# Patient Record
Sex: MALE | Race: WHITE | HISPANIC OR LATINO | Employment: OTHER | URBAN - METROPOLITAN AREA
[De-identification: names, ages, dates, MRNs, and addresses within clinical notes are randomized per-mention and may not be internally consistent; named-entity substitution may affect disease eponyms.]

---

## 2019-10-24 DIAGNOSIS — J45.909 ASTHMA, UNSPECIFIED ASTHMA SEVERITY, UNSPECIFIED WHETHER COMPLICATED, UNSPECIFIED WHETHER PERSISTENT: Primary | ICD-10-CM

## 2019-10-25 DIAGNOSIS — I83.813 VARICOSE VEINS OF BOTH LOWER EXTREMITIES WITH PAIN: Primary | ICD-10-CM

## 2019-10-29 ENCOUNTER — TELEPHONE (OUTPATIENT)
Dept: VASCULAR SURGERY | Facility: CLINIC | Age: 84
End: 2019-10-29

## 2019-10-29 DIAGNOSIS — I87.2 VENOUS INSUFFICIENCY: Primary | ICD-10-CM

## 2019-10-29 NOTE — TELEPHONE ENCOUNTER
Left message that order is in for ultrasound and she can call Olivia or Cynthia at [phone] to schedule the ultrasound. Provided my cell for her to call me to schedule clinic appointment tomorrow, as I will be in [place] for Leadership.

## 2019-11-11 ENCOUNTER — OFFICE VISIT (OUTPATIENT)
Dept: UROLOGY | Facility: CLINIC | Age: 84
End: 2019-11-11

## 2019-11-11 ENCOUNTER — OFFICE VISIT (OUTPATIENT)
Dept: INTERNAL MEDICINE | Facility: CLINIC | Age: 84
End: 2019-11-11

## 2019-11-11 ENCOUNTER — OFFICE VISIT (OUTPATIENT)
Dept: DERMATOLOGY | Facility: CLINIC | Age: 84
End: 2019-11-11

## 2019-11-11 ENCOUNTER — HOSPITAL ENCOUNTER (OUTPATIENT)
Dept: CARDIOLOGY | Facility: CLINIC | Age: 84
Discharge: HOME | End: 2019-11-11

## 2019-11-11 VITALS
WEIGHT: 178.56 LBS | HEART RATE: 48 BPM | BODY MASS INDEX: 29.75 KG/M2 | SYSTOLIC BLOOD PRESSURE: 188 MMHG | HEIGHT: 65 IN | DIASTOLIC BLOOD PRESSURE: 85 MMHG

## 2019-11-11 DIAGNOSIS — R03.0 ELEVATED BLOOD PRESSURE READING WITHOUT DIAGNOSIS OF HYPERTENSION: ICD-10-CM

## 2019-11-11 DIAGNOSIS — L98.9 DISEASE OF SKIN AND SUBCUTANEOUS TISSUE: Primary | ICD-10-CM

## 2019-11-11 DIAGNOSIS — Z85.46 HISTORY OF PROSTATE CANCER: Primary | ICD-10-CM

## 2019-11-11 DIAGNOSIS — Z00.00 ROUTINE GENERAL MEDICAL EXAMINATION AT A HEALTH CARE FACILITY: ICD-10-CM

## 2019-11-11 DIAGNOSIS — Z12.5 PROSTATE CANCER SCREENING: ICD-10-CM

## 2019-11-11 DIAGNOSIS — L29.9 PRURITUS: ICD-10-CM

## 2019-11-11 DIAGNOSIS — Z00.00 ROUTINE GENERAL MEDICAL EXAMINATION AT A HEALTH CARE FACILITY: Primary | ICD-10-CM

## 2019-11-11 LAB
BILIRUB SERPL-MCNC: NORMAL MG/DL
BLOOD URINE, POC: NORMAL
COLOR, POC UA: NORMAL
GLUCOSE UR QL STRIP: NORMAL
KETONES UR QL STRIP: NORMAL
LEUKOCYTE ESTERASE URINE, POC: NORMAL
NITRITE, POC UA: NORMAL
PH, POC UA: 5
PROTEIN, POC: NORMAL
SPECIFIC GRAVITY, POC UA: 1.01
UROBILINOGEN, POC UA: NORMAL

## 2019-11-11 PROCEDURE — 93010 EKG 12-LEAD: ICD-10-PCS | Mod: S$PBB,,, | Performed by: INTERNAL MEDICINE

## 2019-11-11 PROCEDURE — 88313 PR  SPECIAL STAINS,GROUP II: ICD-10-PCS | Mod: 26,,, | Performed by: DERMATOLOGY

## 2019-11-11 PROCEDURE — 99212 OFFICE O/P EST SF 10 MIN: CPT | Mod: PBBFAC,25,27 | Performed by: INTERNAL MEDICINE

## 2019-11-11 PROCEDURE — 99999 PR PBB SHADOW E&M-EST. PATIENT-LVL III: CPT | Mod: PBBFAC,,, | Performed by: UROLOGY

## 2019-11-11 PROCEDURE — 99202 PR OFFICE/OUTPT VISIT, NEW, LEVL II, 15-29 MIN: ICD-10-PCS | Mod: 25,S$PBB,, | Performed by: DERMATOLOGY

## 2019-11-11 PROCEDURE — 93005 ELECTROCARDIOGRAM TRACING: CPT | Mod: PBBFAC | Performed by: INTERNAL MEDICINE

## 2019-11-11 PROCEDURE — 99202 OFFICE O/P NEW SF 15 MIN: CPT | Mod: S$PBB,,, | Performed by: INTERNAL MEDICINE

## 2019-11-11 PROCEDURE — 99999 PR PBB SHADOW E&M-EST. PATIENT-LVL III: CPT | Mod: PBBFAC,,, | Performed by: DERMATOLOGY

## 2019-11-11 PROCEDURE — 99202 OFFICE O/P NEW SF 15 MIN: CPT | Mod: 25,S$PBB,, | Performed by: DERMATOLOGY

## 2019-11-11 PROCEDURE — 99999 PR PBB SHADOW E&M-EST. PATIENT-LVL II: ICD-10-PCS | Mod: PBBFAC,,, | Performed by: INTERNAL MEDICINE

## 2019-11-11 PROCEDURE — 99213 OFFICE O/P EST LOW 20 MIN: CPT | Mod: PBBFAC,25 | Performed by: DERMATOLOGY

## 2019-11-11 PROCEDURE — 99213 OFFICE O/P EST LOW 20 MIN: CPT | Mod: PBBFAC,25,27 | Performed by: UROLOGY

## 2019-11-11 PROCEDURE — 99999 PR PBB SHADOW E&M-EST. PATIENT-LVL III: ICD-10-PCS | Mod: PBBFAC,,, | Performed by: DERMATOLOGY

## 2019-11-11 PROCEDURE — 88313 SPECIAL STAINS GROUP 2: CPT | Mod: 26,,, | Performed by: DERMATOLOGY

## 2019-11-11 PROCEDURE — 11104 PUNCH BX SKIN SINGLE LESION: CPT | Mod: S$PBB,,, | Performed by: DERMATOLOGY

## 2019-11-11 PROCEDURE — 11104 PUNCH BX SKIN SINGLE LESION: CPT | Mod: PBBFAC | Performed by: DERMATOLOGY

## 2019-11-11 PROCEDURE — 81001 URINALYSIS AUTO W/SCOPE: CPT | Mod: PBBFAC | Performed by: UROLOGY

## 2019-11-11 PROCEDURE — 88312 SPECIAL STAINS GROUP 1: CPT | Mod: 26,,, | Performed by: DERMATOLOGY

## 2019-11-11 PROCEDURE — 88305 TISSUE EXAM BY PATHOLOGIST: CPT | Performed by: DERMATOLOGY

## 2019-11-11 PROCEDURE — 88305 TISSUE EXAM BY PATHOLOGIST: CPT | Mod: 26,,, | Performed by: DERMATOLOGY

## 2019-11-11 PROCEDURE — 99202 PR OFFICE/OUTPT VISIT, NEW, LEVL II, 15-29 MIN: ICD-10-PCS | Mod: S$PBB,,, | Performed by: INTERNAL MEDICINE

## 2019-11-11 PROCEDURE — 88305 TISSUE EXAM BY PATHOLOGIST: ICD-10-PCS | Mod: 26,,, | Performed by: DERMATOLOGY

## 2019-11-11 PROCEDURE — 88312 PR  SPECIAL STAINS,GROUP I: ICD-10-PCS | Mod: 26,,, | Performed by: DERMATOLOGY

## 2019-11-11 PROCEDURE — 93010 ELECTROCARDIOGRAM REPORT: CPT | Mod: S$PBB,,, | Performed by: INTERNAL MEDICINE

## 2019-11-11 PROCEDURE — 11104 PR PUNCH BIOPSY, SKIN, SINGLE LESION: ICD-10-PCS | Mod: S$PBB,,, | Performed by: DERMATOLOGY

## 2019-11-11 PROCEDURE — 99203 PR OFFICE/OUTPT VISIT, NEW, LEVL III, 30-44 MIN: ICD-10-PCS | Mod: S$PBB,,, | Performed by: UROLOGY

## 2019-11-11 PROCEDURE — 88313 SPECIAL STAINS GROUP 2: CPT | Performed by: DERMATOLOGY

## 2019-11-11 PROCEDURE — 88312 SPECIAL STAINS GROUP 1: CPT | Performed by: DERMATOLOGY

## 2019-11-11 PROCEDURE — 99203 OFFICE O/P NEW LOW 30 MIN: CPT | Mod: S$PBB,,, | Performed by: UROLOGY

## 2019-11-11 PROCEDURE — 99999 PR PBB SHADOW E&M-EST. PATIENT-LVL II: CPT | Mod: PBBFAC,,, | Performed by: INTERNAL MEDICINE

## 2019-11-11 PROCEDURE — 99999 PR PBB SHADOW E&M-EST. PATIENT-LVL III: ICD-10-PCS | Mod: PBBFAC,,, | Performed by: UROLOGY

## 2019-11-11 RX ORDER — AMLODIPINE BESYLATE 2.5 MG/1
TABLET ORAL
Qty: 10 TABLET | Refills: 0 | Status: ON HOLD | OUTPATIENT
Start: 2019-11-11 | End: 2019-11-19 | Stop reason: SDUPTHER

## 2019-11-11 RX ORDER — FLUTICASONE PROPIONATE 0.05 MG/G
OINTMENT TOPICAL 2 TIMES DAILY
Qty: 30 G | Refills: 2 | Status: SHIPPED | OUTPATIENT
Start: 2019-11-11

## 2019-11-11 NOTE — LETTER
November 11, 2019      Darian Cox MD  [address]  [facility] [address]           [facility]  [address]  [facility] [address]  Phone: [phone]          Patient: Hakan Chan   MR Number: [id number]   YOB: 1935   Date of Visit: 11/11/2019       Dear Dr. Darian Cox:    Thank you for referring Hakan Chan to me for evaluation. Attached you will find relevant portions of my assessment and plan of care.    If you have questions, please do not hesitate to call me. I look forward to following Hakan Chan along with you.    Sincerely,    Abe Walter MD    Enclosure  CC:  No Recipients    If you would like to receive this communication electronically, please contact [email or web address] or [phone] to request more information on [employer] Link access.    For providers and/or their staff who would like to refer a patient to [facility], please contact us through our one-stop-shop provider referral line, [facility], at [phone].    If you feel you have received this communication in error or would no longer like to receive these types of communications, please e-mail [email or web address]

## 2019-11-11 NOTE — PROGRESS NOTES
Subjective:       Patient ID: Hakan Chan is a 84 y.o. male.    Chief Complaint:  Prostate Cancer      History of Present Illness  HPI  Patient is a 84 y.o. male who is new to our clinic and referred by their PCP, Dr. Cox for evaluation of prostate cancer.   The patient is from [facility] and is visiting for medical care.  He is with his daughter who much of the medical history was obtained from today.  He underwent a radical prostatectomy 16 years ago here at [facility].  He subsequently developed a bladder neck contracture for which in the legs he documents I can see that he underwent a trans urethral resection of a bladder neck contracture by Dr. Rubio in 2004.    He reports occasional suprapubic pain.  No significant urinary incontinence.  Otherwise no complaints.  He does not have a recent PSA.  No records are available from outside care.      Review of Systems  Review of Systems  All other systems reviewed and negative except pertinent positives noted in HPI.       Objective:     Physical Exam   Nursing note and vitals reviewed.  Constitutional: He is oriented to person, place, and time. Vital signs are normal. He appears well-developed and well-nourished. He is cooperative.   HENT:   Head: Normocephalic.   Neck: No tracheal deviation present.   Cardiovascular: Normal rate and intact distal pulses.    Pulmonary/Chest: Effort normal. No accessory muscle usage. No tachypnea. No respiratory distress.   Abdominal: Soft. Normal appearance. He exhibits no distension, no fluid wave, no ascites and no mass. There is no tenderness. There is no rebound and no CVA tenderness. No hernia. Hernia confirmed negative in the right inguinal area and confirmed negative in the left inguinal area.       Liver/spleen non-palpable.   Genitourinary: Prostate normal, testes normal and penis normal. Rectal exam shows no external hemorrhoid, no mass, no tenderness and anal tone normal. Prostate is not tender. Right testis  shows no mass and no tenderness. Right testis is descended. Left testis shows no mass and no tenderness. Left testis is descended. Circumcised. Paraphimosis: ARNULFO deferred.   Genitourinary Comments: Scrotum showed no rashes or lesions.  Anus/perineum without lesion.  Epididymis showed no masses or tenderness. No meatal stenosis, meatus in normal location. No penile discharge/plaques.        Musculoskeletal: Normal range of motion.   Lymphadenopathy: No inguinal adenopathy noted on the right or left side.        Right: No inguinal adenopathy present.        Left: No inguinal adenopathy present.   Neurological: He is alert and oriented to person, place, and time. He has normal strength.   Skin: No bruising and no rash noted.     Psychiatric: He has a normal mood and affect. His speech is normal and behavior is normal. Thought content normal.       Lab Review  Lab Results   Component Value Date    COLORU Yellow 08/20/2014    SPECGRAV 1.015 08/20/2014    PHUR 7.0 08/20/2014    NITRITE Negative 08/20/2014    KETONESU Negative 08/20/2014    UROBILINOGEN 1.0 08/20/2014         Assessment:        1. History of prostate cancer            Plan:     History of prostate cancer  -     POCT urinalysis, dipstick or tablet reag  -     Bladder scan    -A post void residual bladder scan was performed immediately after voiding. The patient's PVR was noted to be 0cc.  -he is noncompliant with his CPAP and untreated obstructive sleep apnea can contribute to his significant nocturia which he complains about today.  -urine dip negative for nitrites, negative leukocytes, negative blood---no evidence of infection  -PSA tomorrow ordered by PCP  -follow-up as needed moving forward--PSA will dictate follow-up plan.

## 2019-11-11 NOTE — PROGRESS NOTES
Subjective:       Patient ID:  Hakan Chan is a 84 y.o. male who presents for   Chief Complaint   Patient presents with    Spot     r side of lip and neckline      Spot  - Initial  Affected locations: neck and lips  Duration: 1 year  Signs / symptoms: itching  Severity: mild  Timing: constant  Aggravated by: nothing  Relieving factors/Treatments tried: nothing  Improvement on treatment: no relief        Review of Systems   Constitutional: Negative for fever, chills and fatigue.   Skin: Negative for daily sunscreen use, recent sunburn and wears hat.   Hematologic/Lymphatic: Bruises/bleeds easily.        Objective:    Physical Exam   Constitutional: He appears well-developed and well-nourished.   Neurological: He is alert and oriented to person, place, and time.   Psychiatric: He has a normal mood and affect.   Skin:   Areas Examined (abnormalities noted in diagram):   Head / Face Inspection Performed  Neck Inspection Performed  Chest / Axilla Inspection Performed              Diagram Legend     Erythematous scaling macule/papule c/w actinic keratosis       Vascular papule c/w angioma      Pigmented verrucoid papule/plaque c/w seborrheic keratosis      Yellow umbilicated papule c/w sebaceous hyperplasia      Irregularly shaped tan macule c/w lentigo     1-2 mm smooth white papules consistent with Milia      Movable subcutaneous cyst with punctum c/w epidermal inclusion cyst      Subcutaneous movable cyst c/w pilar cyst      Firm pink to brown papule c/w dermatofibroma      Pedunculated fleshy papule(s) c/w skin tag(s)      Evenly pigmented macule c/w junctional nevus     Mildly variegated pigmented, slightly irregular-bordered macule c/w mildly atypical nevus      Flesh colored to evenly pigmented papule c/w intradermal nevus       Pink pearly papule/plaque c/w basal cell carcinoma      Erythematous hyperkeratotic cursted plaque c/w SCC      Surgical scar with no sign of skin cancer recurrence      Open and  closed comedones      Inflammatory papules and pustules      Verrucoid papule consistent consistent with wart     Erythematous eczematous patches and plaques     Dystrophic onycholytic nail with subungual debris c/w onychomycosis     Umbilicated papule    Erythematous-base heme-crusted tan verrucoid plaque consistent with inflamed seborrheic keratosis     Erythematous Silvery Scaling Plaque c/w Psoriasis     See annotation                  Assessment / Plan:      Pathology Orders:     Normal Orders This Visit    Specimen to Pathology, Dermatology     Questions:    Procedure Type:  Dermatology and skin neoplasms    Number of Specimens:  1    ------------------------:  -------------------------    Spec 1 Procedure:  Biopsy    Spec 1 Clinical Impression:  erythematous to vilaceous 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 [facility] vs. other    Spec 1 Source:  Left cheek        Disease of skin and subcutaneous tissue  -     Specimen to Pathology, Dermatology    Punch biopsy procedure note:  Punch biopsy performed after verbal consent obtained. Area marked and prepped with alcohol. Approximately 1cc of 1% lidocaine with epinephrine injected. 4 mm disposable punch used to remove lesion. Hemostasis obtained and biopsy site closed with 1 - 2 Prolene sutures. Wound care instructions reviewed with patient and handout given.      Pruritus  -     fluticasone propionate (CUTIVATE) 0.005 % ointment; Apply topically 2 (two) times daily.  Dispense: 30 g; Refill: 2  - Use cerave anti- itch cream as often a needed put in fridge. Ice will help with itch as well.                No follow-ups on file.

## 2019-11-12 ENCOUNTER — HOSPITAL ENCOUNTER (OUTPATIENT)
Dept: RADIOLOGY | Facility: HOSPITAL | Age: 84
Discharge: HOME | End: 2019-11-12
Attending: INTERNAL MEDICINE

## 2019-11-12 ENCOUNTER — HOSPITAL ENCOUNTER (OUTPATIENT)
Dept: PULMONOLOGY | Facility: CLINIC | Age: 84
Discharge: HOME | End: 2019-11-12

## 2019-11-12 ENCOUNTER — LAB VISIT (OUTPATIENT)
Dept: LAB | Facility: HOSPITAL | Age: 84
End: 2019-11-12
Attending: INTERNAL MEDICINE

## 2019-11-12 ENCOUNTER — OFFICE VISIT (OUTPATIENT)
Dept: PULMONOLOGY | Facility: CLINIC | Age: 84
End: 2019-11-12

## 2019-11-12 VITALS
SYSTOLIC BLOOD PRESSURE: 122 MMHG | OXYGEN SATURATION: 97 % | WEIGHT: 180 LBS | BODY MASS INDEX: 29.99 KG/M2 | DIASTOLIC BLOOD PRESSURE: 79 MMHG | HEART RATE: 54 BPM | HEIGHT: 65 IN

## 2019-11-12 VITALS — DIASTOLIC BLOOD PRESSURE: 94 MMHG | HEART RATE: 62 BPM | SYSTOLIC BLOOD PRESSURE: 178 MMHG

## 2019-11-12 DIAGNOSIS — J45.909 ASTHMA, UNSPECIFIED ASTHMA SEVERITY, UNSPECIFIED WHETHER COMPLICATED, UNSPECIFIED WHETHER PERSISTENT: ICD-10-CM

## 2019-11-12 DIAGNOSIS — Z12.5 PROSTATE CANCER SCREENING: ICD-10-CM

## 2019-11-12 DIAGNOSIS — J45.909 ASTHMA, UNSPECIFIED ASTHMA SEVERITY, UNSPECIFIED WHETHER COMPLICATED, UNSPECIFIED WHETHER PERSISTENT: Primary | ICD-10-CM

## 2019-11-12 DIAGNOSIS — R03.0 ELEVATED BLOOD PRESSURE READING WITHOUT DIAGNOSIS OF HYPERTENSION: ICD-10-CM

## 2019-11-12 DIAGNOSIS — Z00.00 ROUTINE GENERAL MEDICAL EXAMINATION AT A HEALTH CARE FACILITY: ICD-10-CM

## 2019-11-12 LAB
25(OH)D3+25(OH)D2 SERPL-MCNC: 25 NG/ML (ref 30–96)
ALBUMIN SERPL BCP-MCNC: 4.1 G/DL (ref 3.5–5.2)
ALP SERPL-CCNC: 90 U/L (ref 55–135)
ALT SERPL W/O P-5'-P-CCNC: 20 U/L (ref 10–44)
ANION GAP SERPL CALC-SCNC: 9 MMOL/L (ref 8–16)
AST SERPL-CCNC: 25 U/L (ref 10–40)
BASOPHILS # BLD AUTO: 0.07 K/UL (ref 0–0.2)
BASOPHILS NFR BLD: 1 % (ref 0–1.9)
BILIRUB SERPL-MCNC: 1.5 MG/DL (ref 0.1–1)
BNP SERPL-MCNC: 38 PG/ML (ref 0–99)
BUN SERPL-MCNC: 23 MG/DL (ref 8–23)
CALCIUM SERPL-MCNC: 9.7 MG/DL (ref 8.7–10.5)
CHLORIDE SERPL-SCNC: 110 MMOL/L (ref 95–110)
CHOLEST SERPL-MCNC: 177 MG/DL (ref 120–199)
CHOLEST/HDLC SERPL: 3.8 {RATIO} (ref 2–5)
CO2 SERPL-SCNC: 25 MMOL/L (ref 23–29)
COMPLEXED PSA SERPL-MCNC: 0.16 NG/ML (ref 0–4)
CREAT SERPL-MCNC: 1.4 MG/DL (ref 0.5–1.4)
DIFFERENTIAL METHOD: ABNORMAL
DLCO ADJ PRE: 16.95 ML/(MIN*MMHG) (ref 11.59–25.45)
DLCO SINGLE BREATH LLN: 11.59
DLCO SINGLE BREATH PRE REF: 91.5 %
DLCO SINGLE BREATH REF: 18.52
DLCOC SBVA LLN: 1.86
DLCOC SBVA PRE REF: 110.7 %
DLCOC SBVA REF: 3.25
DLCOC SINGLE BREATH LLN: 11.59
DLCOC SINGLE BREATH PRE REF: 91.5 %
DLCOC SINGLE BREATH REF: 18.52
DLCOCSBVAULN: 4.63
DLCOCSINGLEBREATHULN: 25.45
DLCOSINGLEBREATHULN: 25.45
DLCOVA LLN: 1.86
DLCOVA PRE REF: 110.7 %
DLCOVA PRE: 3.59 ML/(MIN*MMHG*L) (ref 1.86–4.63)
DLCOVA REF: 3.25
DLCOVAULN: 4.63
DLVAADJ PRE: 3.59 ML/(MIN*MMHG*L) (ref 1.86–4.63)
EOSINOPHIL # BLD AUTO: 0.5 K/UL (ref 0–0.5)
EOSINOPHIL NFR BLD: 7.5 % (ref 0–8)
ERYTHROCYTE [DISTWIDTH] IN BLOOD BY AUTOMATED COUNT: 13.6 % (ref 11.5–14.5)
EST. GFR  (AFRICAN AMERICAN): 53 ML/MIN/1.73 M^2
EST. GFR  (NON AFRICAN AMERICAN): 45.8 ML/MIN/1.73 M^2
ESTIMATED AVG GLUCOSE: 114 MG/DL (ref 68–131)
FEF 25 75 LLN: 0.56
FEF 25 75 PRE REF: 151.2 %
FEF 25 75 REF: 1.58
FEV05 LLN: 0.61
FEV05 REF: 1.75
FEV1 FVC LLN: 60
FEV1 FVC PRE REF: 103.3 %
FEV1 FVC REF: 75
FEV1 LLN: 1.48
FEV1 PRE REF: 119.6 %
FEV1 REF: 2.18
FVC LLN: 2.08
FVC PRE REF: 114.8 %
FVC REF: 2.91
GLUCOSE SERPL-MCNC: 111 MG/DL (ref 70–110)
HBA1C MFR BLD HPLC: 5.6 % (ref 4–5.6)
HCT VFR BLD AUTO: 42.4 % (ref 40–54)
HDLC SERPL-MCNC: 47 MG/DL (ref 40–75)
HDLC SERPL: 26.6 % (ref 20–50)
HGB BLD-MCNC: 13.5 G/DL (ref 14–18)
IMM GRANULOCYTES # BLD AUTO: 0.03 K/UL (ref 0–0.04)
IMM GRANULOCYTES NFR BLD AUTO: 0.4 % (ref 0–0.5)
IVC PRE: 3.13 L (ref 2.08–3.75)
IVC SINGLE BREATH LLN: 2.08
IVC SINGLE BREATH PRE REF: 107.6 %
IVC SINGLE BREATH REF: 2.91
IVCSINGLEBREATHULN: 3.75
LDLC SERPL CALC-MCNC: 114.4 MG/DL (ref 63–159)
LYMPHOCYTES # BLD AUTO: 1.1 K/UL (ref 1–4.8)
LYMPHOCYTES NFR BLD: 14.9 % (ref 18–48)
MCH RBC QN AUTO: 29.5 PG (ref 27–31)
MCHC RBC AUTO-ENTMCNC: 31.8 G/DL (ref 32–36)
MCV RBC AUTO: 93 FL (ref 82–98)
MONOCYTES # BLD AUTO: 0.6 K/UL (ref 0.3–1)
MONOCYTES NFR BLD: 7.6 % (ref 4–15)
MVV LLN: 66
MVV REF: 78
NEUTROPHILS # BLD AUTO: 5 K/UL (ref 1.8–7.7)
NEUTROPHILS NFR BLD: 68.6 % (ref 38–73)
NONHDLC SERPL-MCNC: 130 MG/DL
NRBC BLD-RTO: 0 /100 WBC
PEF LLN: 2.77
PEF PRE REF: 167.4 %
PEF REF: 4.92
PLATELET # BLD AUTO: 224 K/UL (ref 150–350)
PMV BLD AUTO: 9.4 FL (ref 9.2–12.9)
POTASSIUM SERPL-SCNC: 4.5 MMOL/L (ref 3.5–5.1)
PRE DLCO: 16.95 ML/(MIN*MMHG) (ref 11.59–25.45)
PRE FEF 25 75: 2.39 L/S (ref 0.56–2.59)
PRE FET 100: 7.81 SEC
PRE FEV05 REF: 124.1 %
PRE FEV1 FVC: 77.93 % (ref 59.84–91)
PRE FEV1: 2.61 L (ref 1.48–2.87)
PRE FEV5: 2.17 L (ref 0.61–2.88)
PRE FVC: 3.34 L (ref 2.08–3.75)
PRE PEF: 8.24 L/S (ref 2.77–7.08)
PROT SERPL-MCNC: 7 G/DL (ref 6–8.4)
RBC # BLD AUTO: 4.57 M/UL (ref 4.6–6.2)
SODIUM SERPL-SCNC: 144 MMOL/L (ref 136–145)
TRIGL SERPL-MCNC: 78 MG/DL (ref 30–150)
TSH SERPL DL<=0.005 MIU/L-ACNC: 1.43 UIU/ML (ref 0.4–4)
VA PRE: 4.72 L (ref 5.55–5.55)
VA SINGLE BREATH LLN: 5.55
VA SINGLE BREATH PRE REF: 84.9 %
VA SINGLE BREATH REF: 5.55
VASINGLEBREATHULN: 5.55
WBC # BLD AUTO: 7.23 K/UL (ref 3.9–12.7)

## 2019-11-12 PROCEDURE — 82306 VITAMIN D 25 HYDROXY: CPT

## 2019-11-12 PROCEDURE — 80061 LIPID PANEL: CPT

## 2019-11-12 PROCEDURE — 80053 COMPREHEN METABOLIC PANEL: CPT

## 2019-11-12 PROCEDURE — 85025 COMPLETE CBC W/AUTO DIFF WBC: CPT

## 2019-11-12 PROCEDURE — 71046 X-RAY EXAM CHEST 2 VIEWS: CPT | Mod: TC,FY

## 2019-11-12 PROCEDURE — 94010 BREATHING CAPACITY TEST: CPT | Mod: PBBFAC | Performed by: INTERNAL MEDICINE

## 2019-11-12 PROCEDURE — 99214 OFFICE O/P EST MOD 30 MIN: CPT | Mod: PBBFAC,25 | Performed by: INTERNAL MEDICINE

## 2019-11-12 PROCEDURE — 94010 BREATHING CAPACITY TEST: CPT | Mod: 26,S$PBB,, | Performed by: INTERNAL MEDICINE

## 2019-11-12 PROCEDURE — 83036 HEMOGLOBIN GLYCOSYLATED A1C: CPT

## 2019-11-12 PROCEDURE — 99204 OFFICE O/P NEW MOD 45 MIN: CPT | Mod: 25,S$PBB,, | Performed by: INTERNAL MEDICINE

## 2019-11-12 PROCEDURE — 99204 PR OFFICE/OUTPT VISIT, NEW, LEVL IV, 45-59 MIN: ICD-10-PCS | Mod: 25,S$PBB,, | Performed by: INTERNAL MEDICINE

## 2019-11-12 PROCEDURE — 71046 XR CHEST PA AND LATERAL: ICD-10-PCS | Mod: 26,,, | Performed by: RADIOLOGY

## 2019-11-12 PROCEDURE — 83880 ASSAY OF NATRIURETIC PEPTIDE: CPT

## 2019-11-12 PROCEDURE — 84153 ASSAY OF PSA TOTAL: CPT

## 2019-11-12 PROCEDURE — 94729 DIFFUSING CAPACITY: CPT | Mod: 26,S$PBB,, | Performed by: INTERNAL MEDICINE

## 2019-11-12 PROCEDURE — 99999 PR PBB SHADOW E&M-EST. PATIENT-LVL IV: ICD-10-PCS | Mod: PBBFAC,,, | Performed by: INTERNAL MEDICINE

## 2019-11-12 PROCEDURE — 94729 PR C02/MEMBANE DIFFUSE CAPACITY: ICD-10-PCS | Mod: 26,S$PBB,, | Performed by: INTERNAL MEDICINE

## 2019-11-12 PROCEDURE — 84443 ASSAY THYROID STIM HORMONE: CPT

## 2019-11-12 PROCEDURE — 71046 X-RAY EXAM CHEST 2 VIEWS: CPT | Mod: 26,,, | Performed by: RADIOLOGY

## 2019-11-12 PROCEDURE — 36415 COLL VENOUS BLD VENIPUNCTURE: CPT

## 2019-11-12 PROCEDURE — 99999 PR PBB SHADOW E&M-EST. PATIENT-LVL IV: CPT | Mod: PBBFAC,,, | Performed by: INTERNAL MEDICINE

## 2019-11-12 PROCEDURE — 94729 DIFFUSING CAPACITY: CPT | Mod: PBBFAC | Performed by: INTERNAL MEDICINE

## 2019-11-12 PROCEDURE — 94010 BREATHING CAPACITY TEST: ICD-10-PCS | Mod: 26,S$PBB,, | Performed by: INTERNAL MEDICINE

## 2019-11-12 RX ORDER — ALBUTEROL SULFATE 90 UG/1
2 AEROSOL, METERED RESPIRATORY (INHALATION) EVERY 6 HOURS PRN
Qty: 1 INHALER | Refills: 11 | Status: SHIPPED | OUTPATIENT
Start: 2019-11-12 | End: 2019-11-12

## 2019-11-12 RX ORDER — ALBUTEROL SULFATE 90 UG/1
2 AEROSOL, METERED RESPIRATORY (INHALATION) EVERY 6 HOURS PRN
Qty: 8.5 G | Refills: 11 | Status: SHIPPED | OUTPATIENT
Start: 2019-11-12

## 2019-11-12 RX ORDER — MONTELUKAST SODIUM 10 MG/1
10 TABLET ORAL NIGHTLY
Qty: 30 TABLET | Refills: 11 | Status: SHIPPED | OUTPATIENT
Start: 2019-11-12 | End: 2019-11-12

## 2019-11-12 RX ORDER — MONTELUKAST SODIUM 10 MG/1
10 TABLET ORAL NIGHTLY
Qty: 30 TABLET | Refills: 11 | Status: SHIPPED | OUTPATIENT
Start: 2019-11-12

## 2019-11-12 NOTE — PROGRESS NOTES
Subjective:       Patient ID: Hakan Chan is a 84 y.o. male.    Chief Complaint: Annual Exam    HPI Mr Chan is herlinda pleasant gentleman from [facility] here to re-establish care. It has been several years since his last visit. Overall doing well and had no specific complaints. He has issues with hearing loss and wears aides for this, but not pleased with the results. We briefly discussed newer aides available, but he is not staying in this country long. He reports some issues with lightheadeness when he rises from sitting or lying positions, but recovers rapidly. On exam his BP was elevated and he experienced mild lightheadeness when he arose from the examining table. I suspect that his BP has some influence in this and I decided on trila of low dose amlodipine 2.5 mgs daily and will see how he tolerates it. I will check his BP before he leaves the country and adjust dose if indicated. Benefits and risks, side effects were discussed.   Review of Systems   Constitutional: Negative for activity change, appetite change, fatigue and unexpected weight change.   HENT: Positive for hearing loss.    Eyes: Positive for visual disturbance.        Floaters.   Respiratory: Negative for cough, shortness of breath and wheezing.    Cardiovascular: Negative for chest pain, palpitations and leg swelling.   Gastrointestinal: Negative.    Genitourinary: Negative for difficulty urinating.   Musculoskeletal: Negative.    Neurological: Positive for light-headedness. Negative for dizziness, tremors, facial asymmetry, weakness, numbness and headaches.   Hematological: Negative.        Objective:      Physical Exam   Constitutional: He is oriented to person, place, and time. He appears well-developed and well-nourished. No distress.   HENT:   Head: Normocephalic and atraumatic.   Right Ear: External ear normal.   Left Ear: External ear normal.   Mouth/Throat: Oropharynx is clear and moist. No oropharyngeal exudate.   Eyes: Pupils  are equal, round, and reactive to light. Conjunctivae and EOM are normal. Right eye exhibits no discharge. Left eye exhibits no discharge. No scleral icterus.   Neck: Normal range of motion. Neck supple. No JVD present. No thyromegaly present.   Cardiovascular: Normal rate, regular rhythm, normal heart sounds and intact distal pulses.   No murmur heard.  Pulmonary/Chest: Effort normal and breath sounds normal. No respiratory distress. He has no wheezes. He exhibits no tenderness.   Abdominal: Soft. Bowel sounds are normal. He exhibits no distension and no mass.   Musculoskeletal: Normal range of motion. He exhibits no edema or tenderness.   Lymphadenopathy:     He has no cervical adenopathy.   Neurological: He is alert and oriented to person, place, and time. No cranial nerve deficit. Coordination normal.   Skin: Skin is warm and dry. No rash noted. He is not diaphoretic. No erythema.   Psychiatric: He has a normal mood and affect. His behavior is normal. Judgment and thought content normal.   Nursing note and vitals reviewed.      Assessment:       1. Routine general medical examination at a health care facility    2. Elevated blood pressure reading without diagnosis of hypertension    3. Prostate cancer screening     4.     Vision issues - floaters.     Plan:    1. Obtain blood work/EKG,         2. Start amlodipine 2.5 mgs daily.         3. Refer to Optometry.         4. RTC for review.

## 2019-11-12 NOTE — PROGRESS NOTES
Subjective:       Patient ID: Hakan Chan is a 84 y.o. male.    Chief Complaint: Asthma Evaluation    HPI:   Hakan Chan is a 84 y.o. male who presents for asthma evaluation. He is from [facility] and will be returning in few weeks. [place] the [place] visit with [place]. Daughter and [employer] interpretor at bedside. Self referred for asthma evaluation. Over the last 2 months, he explains has been without asthma medication. Currenlty, he rates his asthma as well controlled with ACT score of 21. Explains has used Breo in the past which completely controlled his symptoms. Identifies triggers as wheezing, dyspnea and non productive cough. Denies chest tightness or pain. Tells he does not have a rescue inhaler. With weather change reports associated symptoms of nasal congestion and postnasal drip.  Has not tried antihistamine. Suspected precipitants include cold air and dust. Also, repots hx of sleep apnea and noncompliant with CPAP. Reports insignificant smoking hx.     Review of Systems   Constitutional: Negative for fever, chills, weight loss and night sweats.   HENT: Positive for postnasal drip, rhinorrhea and congestion. Negative for trouble swallowing.    Respiratory: Positive for chest tightness and wheezing. Negative for cough, sputum production, choking, dyspnea on extertion and use of rescue inhaler.    Cardiovascular: Negative for chest pain and leg swelling.   Musculoskeletal: Negative for joint swelling.   Skin: Negative for rash.   Gastrointestinal: Negative for acid reflux.   Neurological: Negative for dizziness and light-headedness.   Hematological: Negative for adenopathy.   Psychiatric/Behavioral: Negative for sleep disturbance (  ).         Social History     Tobacco Use    Smoking status: Former Smoker     Last attempt to quit: 1974     Years since quittin.2   Substance Use Topics    Alcohol use: No     Comment: seldom       Review of patient's allergies indicates:  No Known  "Allergies  Past Medical History:   Diagnosis Date    Glaucoma     Glaucoma     Prostate cancer     Varicose veins of legs      Past Surgical History:   Procedure Laterality Date    CATARACT EXTRACTION W/  INTRAOCULAR LENS IMPLANT      OU    PROSTATE SURGERY       Current Outpatient Medications on File Prior to Visit   Medication Sig    amLODIPine (NORVASC) 2.5 MG tablet Lyle 1 tableta por love.    dorzolamide-timolol 2-0.5% (COSOPT) 2-0.5 % ophthalmic solution Place 1 drop into both eyes 2 (two) times daily.    fluticasone propionate (CUTIVATE) 0.005 % ointment Apply topically 2 (two) times daily.    travoprost, benzalkonium, (TRAVATAN) 0.004 % ophthalmic solution Place 1 drop into both eyes 2 (two) times daily.     No current facility-administered medications on file prior to visit.        Objective:       Vitals:    11/12/19 1105   BP: 122/79   BP Location: Right arm   Patient Position: Sitting   Pulse: (!) 54   SpO2: 97%   Weight: 81.6 kg (180 lb)   Height: 5' 5" (1.651 m)     Physical Exam   Constitutional: He is oriented to person, place, and time. He appears well-developed and well-nourished. No distress.   HENT:   Head: Normocephalic.   Neck: Normal range of motion. Neck supple.   Cardiovascular: Normal rate, regular rhythm and normal heart sounds.   Pulmonary/Chest: Normal expansion, effort normal and breath sounds normal. No respiratory distress. He has no wheezes. He has no rhonchi.   Abdominal: Soft. Bowel sounds are normal.   Musculoskeletal: Normal range of motion. He exhibits no edema.   Lymphadenopathy: No supraclavicular adenopathy is present.     He has no cervical adenopathy.   Neurological: He is alert and oriented to person, place, and time. Gait normal.   Skin: Skin is warm and dry. Nails show no clubbing.   Psychiatric: He has a normal mood and affect. His behavior is normal.   Vitals reviewed.    Personal Diagnostic Review  Pulmonary function tests: FEV1: 2.61  (120 % predicted), " FVC:  3.34 (115 % predicted), FEV1/FVC:  78, DLCO: 16.9 (92 % predicted)    CXR 11/12/2019-  Impression       No acute process seen.     Results for ELIOT MOE (MRN [id number]) as of 11/12/2019 11:16   Ref. Range 11/12/2019 07:34   WBC Latest Ref Range: 3.90 - 12.70 K/uL 7.23   RBC Latest Ref Range: 4.60 - 6.20 M/uL 4.57 (L)   Hemoglobin Latest Ref Range: 14.0 - 18.0 g/dL 13.5 (L)   Hematocrit Latest Ref Range: 40.0 - 54.0 % 42.4   MCV Latest Ref Range: 82 - 98 fL 93   MCH Latest Ref Range: 27.0 - 31.0 pg 29.5   MCHC Latest Ref Range: 32.0 - 36.0 g/dL 31.8 (L)   RDW Latest Ref Range: 11.5 - 14.5 % 13.6   Platelets Latest Ref Range: 150 - 350 K/uL 224   MPV Latest Ref Range: 9.2 - 12.9 fL 9.4   Gran% Latest Ref Range: 38.0 - 73.0 % 68.6   Gran # (ANC) Latest Ref Range: 1.8 - 7.7 K/uL 5.0   Lymph% Latest Ref Range: 18.0 - 48.0 % 14.9 (L)   Lymph # Latest Ref Range: 1.0 - 4.8 K/uL 1.1   Mono% Latest Ref Range: 4.0 - 15.0 % 7.6   Mono # Latest Ref Range: 0.3 - 1.0 K/uL 0.6   Eosinophil% Latest Ref Range: 0.0 - 8.0 % 7.5   Eos # Latest Ref Range: 0.0 - 0.5 K/uL 0.5   Basophil% Latest Ref Range: 0.0 - 1.9 % 1.0   Baso # Latest Ref Range: 0.00 - 0.20 K/uL 0.07   nRBC Latest Ref Range: 0 /100 WBC 0   Differential Method Unknown Automated   Immature Grans (Abs) Latest Ref Range: 0.00 - 0.04 K/uL 0.03   Immature Granulocytes Latest Ref Range: 0.0 - 0.5 % 0.4   Sodium Latest Ref Range: 136 - 145 mmol/L 144   Potassium Latest Ref Range: 3.5 - 5.1 mmol/L 4.5   Chloride Latest Ref Range: 95 - 110 mmol/L 110   CO2 Latest Ref Range: 23 - 29 mmol/L 25   Anion Gap Latest Ref Range: 8 - 16 mmol/L 9   BUN, Bld Latest Ref Range: 8 - 23 mg/dL 23   Creatinine Latest Ref Range: 0.5 - 1.4 mg/dL 1.4   eGFR if non African American Latest Ref Range: >60 mL/min/1.73 m^2 45.8 (A)   eGFR if African American Latest Ref Range: >60 mL/min/1.73 m^2 53.0 (A)   Glucose Latest Ref Range: 70 - 110 mg/dL 111 (H)   Calcium Latest Ref Range: 8.7 -  10.5 mg/dL 9.7   Alkaline Phosphatase Latest Ref Range: 55 - 135 U/L 90   PROTEIN TOTAL Latest Ref Range: 6.0 - 8.4 g/dL 7.0   Albumin Latest Ref Range: 3.5 - 5.2 g/dL 4.1   BILIRUBIN TOTAL Latest Ref Range: 0.1 - 1.0 mg/dL 1.5 (H)   AST Latest Ref Range: 10 - 40 U/L 25   ALT Latest Ref Range: 10 - 44 U/L 20   Triglycerides Latest Ref Range: 30 - 150 mg/dL 78     Results for ELIOT MOE (MRN [id number]) as of 11/12/2019 11:16   Ref. Range 11/12/2019 07:34   BNP Latest Ref Range: 0 - 99 pg/mL 38         Assessment:     Problem List Items Addressed This Visit        Pulmonary    Asthma - Primary    Overview     Reports known HX of asthma with response to (ICS/LABA) inhaler therapy. Eosinophil% 7.5. He is self pay. PFTs not suggestive of obstructive lung disease. Possible Cough variant or eosinophilic asthma.     Plan:  -Prescribe Qvar daily.  Rinse mouth after use.    -Prescribe Albuterol as needed  -Prescribed Singular  -Start Zyrtec daily.   -Follow up as needed.         Relevant Medications    albuterol (VENTOLIN HFA) 90 mcg/actuation inhaler    beclomethasone (QVAR) 40 mcg/actuation Aero    montelukast (SINGULAIR) 10 mg tablet      Follow up as needed

## 2019-11-12 NOTE — LETTER
November 17, 2019      Darian Cox MD  [address] [facility] [address]           [facility]  [address]  [facility] [address]  Phone: [phone]          Patient: Hakan Chan   MR Number: [id number]   YOB: 1935   Date of Visit: 11/12/2019       Dear :    Thank you for referring Hakan Chan to me for evaluation. Attached you will find relevant portions of my assessment and plan of care.    If you have questions, please do not hesitate to call me. I look forward to following Hakan Chan along with you.    Sincerely,    Karo De Dios MD    Enclosure  CC:  No Recipients    If you would like to receive this communication electronically, please contact [email or web address] or [phone] to request more information on [employer] Link access.    For providers and/or their staff who would like to refer a patient to [facility], please contact us through our one-stop-shop provider referral line, [facility], at [phone].    If you feel you have received this communication in error or would no longer like to receive these types of communications, please e-mail [email or web address]

## 2019-11-13 NOTE — PROGRESS NOTES
HPI     Glaucoma      Additional comments: HVF and OCT review today and pt states no changes   since last exam               Comments     DLS: 8/19/14 ([facility])  -Pt from [facility] here today with his daughter for glaucoma ck. Pt was last   seen in 2014 but he does see an eye doctor back in [facility] for his follow   ups.     1. 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 PCIOL OU  3. Refractive Error    MEDS:  Cosopt QAM OU = PT ONLY USES QDAY NOT BID  Travatan Z QHS OU          Last edited by Sunitha Claros MA on 11/14/2019 11:37 AM. (History)            Assessment /Plan     For exam results, see Encounter Report.    Primary open-angle glaucoma, right eye, moderate stage    Primary open-angle glaucoma, left eye, mild stage    Vitreous in anterior chamber of both eyes    Pseudophakia of both eyes          Last [facility] eye visit in 8/2014 - 5 yrs ago     1. POAG OU (OD>>OS)   Family history None   Glaucoma meds Cosopt bid ou, jeanna z qhs ouH/O adverse rxn to glaucoma drops none   LASERS SLT 5/05 and 6/05  GLAUCOMA SURGERIES None   OTHER EYE SURGERIES PCIOL OU   CDR 0.8/0.8  Tbase ? Off gtts - on gtts 20 - 22 / 20 - 22  Tmax ?off gtts - on gtts 22/22  Ttarget 18 ou  HVF 6 tests 2003 - 2019 - SAD od // gen dep os   Gonio- open enough for slt ou  /544   OCT 2  Test 2005 to 2019 - RNFL -  dec TI  od, // bord TI os  HRT 1 test abnormal od?  Increased SD os  Disc photos - 2014     Ttoday 22/21 - out of travatan and only using cosopt 1 x day   Test done today HVF/DFE/OCT/IOP    2.  PSK OU  - stable, monitor  - hx of vit prolapse ou    3. refracive error   Unable to improve with MR   Rx glasses given - no change        IOP - up a bit - no travatan for 1 week and only using cosopt 1 x day   Re-start travatan ou q hs   increase cosopt to bid   HVF stable - 11/14/2019   OCT stable 11/14/2019     Rx gfor glasses given 11/14/2019     rtc prn return from [facility]    Photo file / flow sheet updated 11/2019     I have seen and personally examined  the patient.  I agree with the findings, assessment and plan of the resident and/or fellow.     Marlin Verdugo MD

## 2019-11-14 ENCOUNTER — OFFICE VISIT (OUTPATIENT)
Dept: OPHTHALMOLOGY | Facility: CLINIC | Age: 84
End: 2019-11-14

## 2019-11-14 ENCOUNTER — OFFICE VISIT (OUTPATIENT)
Dept: GASTROENTEROLOGY | Facility: CLINIC | Age: 84
End: 2019-11-14

## 2019-11-14 ENCOUNTER — CLINICAL SUPPORT (OUTPATIENT)
Dept: OPHTHALMOLOGY | Facility: CLINIC | Age: 84
End: 2019-11-14

## 2019-11-14 ENCOUNTER — HOSPITAL ENCOUNTER (OUTPATIENT)
Dept: RADIOLOGY | Facility: HOSPITAL | Age: 84
Discharge: HOME | End: 2019-11-14
Attending: INTERNAL MEDICINE

## 2019-11-14 VITALS
HEIGHT: 65 IN | SYSTOLIC BLOOD PRESSURE: 120 MMHG | DIASTOLIC BLOOD PRESSURE: 71 MMHG | HEART RATE: 61 BPM | WEIGHT: 179.88 LBS | BODY MASS INDEX: 29.97 KG/M2

## 2019-11-14 DIAGNOSIS — Z96.1 PSEUDOPHAKIA OF BOTH EYES: ICD-10-CM

## 2019-11-14 DIAGNOSIS — R10.84 GENERALIZED ABDOMINAL PAIN: ICD-10-CM

## 2019-11-14 DIAGNOSIS — H43.03: ICD-10-CM

## 2019-11-14 DIAGNOSIS — H40.1121 PRIMARY OPEN-ANGLE GLAUCOMA, LEFT EYE, MILD STAGE: ICD-10-CM

## 2019-11-14 DIAGNOSIS — H40.1112 PRIMARY OPEN-ANGLE GLAUCOMA, RIGHT EYE, MODERATE STAGE: Primary | ICD-10-CM

## 2019-11-14 PROCEDURE — 74177 CT ABDOMEN PELVIS WITH CONTRAST: ICD-10-PCS | Mod: 26,,, | Performed by: RADIOLOGY

## 2019-11-14 PROCEDURE — 92133 CPTRZD OPH DX IMG PST SGM ON: CPT | Mod: PBBFAC | Performed by: OPHTHALMOLOGY

## 2019-11-14 PROCEDURE — 99999 PR PBB SHADOW E&M-EST. PATIENT-LVL III: ICD-10-PCS | Mod: PBBFAC,,, | Performed by: INTERNAL MEDICINE

## 2019-11-14 PROCEDURE — 25500020 PHARM REV CODE 255: Performed by: INTERNAL MEDICINE

## 2019-11-14 PROCEDURE — 74177 CT ABD & PELVIS W/CONTRAST: CPT | Mod: 26,,, | Performed by: RADIOLOGY

## 2019-11-14 PROCEDURE — 99212 OFFICE O/P EST SF 10 MIN: CPT | Mod: PBBFAC,27,25 | Performed by: OPHTHALMOLOGY

## 2019-11-14 PROCEDURE — 92014 PR EYE EXAM, EST PATIENT,COMPREHESV: ICD-10-PCS | Mod: S$PBB,,, | Performed by: OPHTHALMOLOGY

## 2019-11-14 PROCEDURE — 99999 PR PBB SHADOW E&M-EST. PATIENT-LVL II: CPT | Mod: PBBFAC,,, | Performed by: OPHTHALMOLOGY

## 2019-11-14 PROCEDURE — 74177 CT ABD & PELVIS W/CONTRAST: CPT | Mod: TC

## 2019-11-14 PROCEDURE — 99204 PR OFFICE/OUTPT VISIT, NEW, LEVL IV, 45-59 MIN: ICD-10-PCS | Mod: S$PBB,,, | Performed by: INTERNAL MEDICINE

## 2019-11-14 PROCEDURE — 92083 HUMPHREY VISUAL FIELD - OU - BOTH EYES: ICD-10-PCS | Mod: 26,S$PBB,, | Performed by: OPHTHALMOLOGY

## 2019-11-14 PROCEDURE — 92133 POSTERIOR SEGMENT OCT OPTIC NERVE(OCULAR COHERENCE TOMOGRAPHY) - OU - BOTH EYES: ICD-10-PCS | Mod: 26,S$PBB,, | Performed by: OPHTHALMOLOGY

## 2019-11-14 PROCEDURE — 92083 EXTENDED VISUAL FIELD XM: CPT | Mod: PBBFAC | Performed by: OPHTHALMOLOGY

## 2019-11-14 PROCEDURE — 99213 OFFICE O/P EST LOW 20 MIN: CPT | Mod: PBBFAC | Performed by: INTERNAL MEDICINE

## 2019-11-14 PROCEDURE — 92014 COMPRE OPH EXAM EST PT 1/>: CPT | Mod: S$PBB,,, | Performed by: OPHTHALMOLOGY

## 2019-11-14 PROCEDURE — 99999 PR PBB SHADOW E&M-EST. PATIENT-LVL III: CPT | Mod: PBBFAC,,, | Performed by: INTERNAL MEDICINE

## 2019-11-14 PROCEDURE — 99999 PR PBB SHADOW E&M-EST. PATIENT-LVL II: ICD-10-PCS | Mod: PBBFAC,,, | Performed by: OPHTHALMOLOGY

## 2019-11-14 PROCEDURE — 99204 OFFICE O/P NEW MOD 45 MIN: CPT | Mod: S$PBB,,, | Performed by: INTERNAL MEDICINE

## 2019-11-14 RX ORDER — FLUTICASONE PROPIONATE 110 UG/1
1 AEROSOL, METERED RESPIRATORY (INHALATION) 2 TIMES DAILY
Qty: 12 G | Refills: 3 | Status: SHIPPED | OUTPATIENT
Start: 2019-11-14

## 2019-11-14 RX ORDER — TRAVOPROST OPHTHALMIC SOLUTION 0.04 MG/ML
1 SOLUTION OPHTHALMIC NIGHTLY
Qty: 2.5 ML | Refills: 12 | Status: SHIPPED | OUTPATIENT
Start: 2019-11-14

## 2019-11-14 RX ORDER — DORZOLAMIDE HYDROCHLORIDE AND TIMOLOL MALEATE 20; 5 MG/ML; MG/ML
1 SOLUTION/ DROPS OPHTHALMIC 2 TIMES DAILY
Qty: 10 ML | Refills: 11 | Status: SHIPPED | OUTPATIENT
Start: 2019-11-14

## 2019-11-14 RX ADMIN — IOHEXOL 100 ML: 350 INJECTION, SOLUTION INTRAVENOUS at 04:11

## 2019-11-14 NOTE — PROGRESS NOTES
hvf done;rel/fix/coop. Good ou./chart checked for latex allergy.-[facility]

## 2019-11-14 NOTE — PROGRESS NOTES
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  He was found to have a "spot in the pancreas" last year when he underwent since scan of his abdomen in [facility].  He was supposed to get that checked out further with additional testing but a machine was not working and therefore was not done.  He is here to follow-up on that issue.  They were supposed to bring in the previous records but were lost by the airlines.  Currently he denies any symptoms per se.  The past 6 months intermittently he has been having mid epigastric and periumbilical pain when he exerts himself i.e. 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  Denies any radiation of pain to the back.  He denies any dysphagia, odynophagia, nausea, vomiting or any major issues with his bowels.  He does tend to get slightly constipated and is usually resolved with taking fiber supplementation.  His last colonoscopy GI doctor was 5 years ago but last year he had a repeat colonoscopy in [facility] and also had an upper endoscopy which were both unremarkable per his daughter.  Denies any history of alcohol consumption or tobacco use.  Record suggests he quit smoking in since 1974.  Overall his appetite is fair and has not lost any weight.  Denies any history of pancreatitis.    Past medical, surgical, social and family history reviewed in epic    Medication allergies reviewed in epic    Review of systems:  Constitutional:  No fever, no chills, no weight loss, appetite is normal  Eyes:  No visual changes, no red eyes  ENT:  No odynophagia or hoarseness of voice  Cardiovascular:  No angina or palpitation  Respiratory:  No shortness of breath or wheezing  Genitourinary:  No dysuria or frequency  Musculoskeletal:  No myalgia or arthralgia  Skin:  No pruritus or eczema  Neurologic:  No headaches or seizures  Psychiatric:  No anxiety or " "depression  Gastrointestinal:  See HPI, no blood in the stool    Physical exam:  Vitals see epic, awake alert oriented x3 in no acute distress  Neck:  Supple, no carotid bruit, no cervical adenopathy  Abdomen:  Obese, soft, nondistended, mild tenderness to palpation in the periumbilical area, no guarding or rigidity, bowel sounds are normal, no abdominal bruits heard  Eyes:  Conjunctivae anicteric, not injected  ENT:  Oral mucosa moist, Mallampati 2  Cardiovascular:  S1, S2 normal, no murmurs or gallops  Respiratory:  Bilateral air entry equal, no rhonchi crackles  Skin:  No palmar erythema or spider angiomata  Neurologic:  No asterixis or tremors, gait slightly unsteady  Lower extremities:  No pedal edema  Psychiatric:  Affect appropriate    Recent labs reviewed    Impression:  A "spot" found on the pancreas on imaging in [facility].  Report currently not available.    Recommendation:  CT pancreas mass protocol.  Further recommendations based on the findings.  "

## 2019-11-15 ENCOUNTER — TELEPHONE (OUTPATIENT)
Dept: ENDOSCOPY | Facility: HOSPITAL | Age: 84
End: 2019-11-15

## 2019-11-15 DIAGNOSIS — K86.2 PANCREATIC CYST: Primary | ICD-10-CM

## 2019-11-15 NOTE — TELEPHONE ENCOUNTER
----- Message from Burak Baker MD sent at 11/15/2019 12:08 PM CST -----  Ok  R  ----- Message -----  From: Martina Berkowitz MA  Sent: 11/15/2019   7:59 AM CST  To: Burak Baker MD    Please review. He will be leaving on WED. Can we add Monday?  ----- Message -----  From: Iron Ronquillo MD  Sent: 11/15/2019   7:57 AM CST  To: Martina Berkowitz MA    Please schedule EUS ASAP. International patient.

## 2019-11-18 ENCOUNTER — CLINICAL SUPPORT (OUTPATIENT)
Dept: DERMATOLOGY | Facility: CLINIC | Age: 84
End: 2019-11-18

## 2019-11-18 ENCOUNTER — TELEPHONE (OUTPATIENT)
Dept: ENDOSCOPY | Facility: HOSPITAL | Age: 84
End: 2019-11-18

## 2019-11-18 ENCOUNTER — HOSPITAL ENCOUNTER (OUTPATIENT)
Dept: VASCULAR SURGERY | Facility: CLINIC | Age: 84
Discharge: HOME | End: 2019-11-18
Attending: SURGERY

## 2019-11-18 ENCOUNTER — INITIAL CONSULT (OUTPATIENT)
Dept: VASCULAR SURGERY | Facility: CLINIC | Age: 84
End: 2019-11-18
Attending: SURGERY

## 2019-11-18 ENCOUNTER — ANESTHESIA EVENT (OUTPATIENT)
Dept: ENDOSCOPY | Facility: HOSPITAL | Age: 84
End: 2019-11-18

## 2019-11-18 VITALS
DIASTOLIC BLOOD PRESSURE: 65 MMHG | WEIGHT: 178.56 LBS | BODY MASS INDEX: 29.75 KG/M2 | HEART RATE: 53 BPM | HEIGHT: 65 IN | TEMPERATURE: 98 F | SYSTOLIC BLOOD PRESSURE: 151 MMHG

## 2019-11-18 DIAGNOSIS — I87.2 VENOUS INSUFFICIENCY OF LEFT LEG: Primary | ICD-10-CM

## 2019-11-18 DIAGNOSIS — I87.2 VENOUS INSUFFICIENCY: ICD-10-CM

## 2019-11-18 PROCEDURE — 93970 PR US DUPLEX, UPPER OR LOWER EXT VENOUS,COMPLETE BILAT: ICD-10-PCS | Mod: 26,S$PBB,, | Performed by: SURGERY

## 2019-11-18 PROCEDURE — 99999 PR PBB SHADOW E&M-EST. PATIENT-LVL III: ICD-10-PCS | Mod: PBBFAC,,, | Performed by: SURGERY

## 2019-11-18 PROCEDURE — 99213 OFFICE O/P EST LOW 20 MIN: CPT | Mod: PBBFAC,27 | Performed by: SURGERY

## 2019-11-18 PROCEDURE — 99212 OFFICE O/P EST SF 10 MIN: CPT | Mod: PBBFAC,25

## 2019-11-18 PROCEDURE — 99203 PR OFFICE/OUTPT VISIT, NEW, LEVL III, 30-44 MIN: ICD-10-PCS | Mod: S$PBB,,, | Performed by: SURGERY

## 2019-11-18 PROCEDURE — 93970 EXTREMITY STUDY: CPT | Mod: 26,S$PBB,, | Performed by: SURGERY

## 2019-11-18 PROCEDURE — 99203 OFFICE O/P NEW LOW 30 MIN: CPT | Mod: S$PBB,,, | Performed by: SURGERY

## 2019-11-18 PROCEDURE — 93970 EXTREMITY STUDY: CPT | Mod: PBBFAC | Performed by: SURGERY

## 2019-11-18 PROCEDURE — 99999 PR PBB SHADOW E&M-EST. PATIENT-LVL III: CPT | Mod: PBBFAC,,, | Performed by: SURGERY

## 2019-11-18 PROCEDURE — 99999 PR PBB SHADOW E&M-EST. PATIENT-LVL II: CPT | Mod: PBBFAC,,,

## 2019-11-18 PROCEDURE — 99999 PR PBB SHADOW E&M-EST. PATIENT-LVL II: ICD-10-PCS | Mod: PBBFAC,,,

## 2019-11-18 NOTE — LETTER
November 19, 2019      Darian Cox MD  [address]  [facility] [address]           [facility]  [address]  [facility] [address]  Phone: [phone]  Fax: [phone]          Patient: Hakan Chan   MR Number: [id number]   YOB: 1935   Date of Visit: 11/18/2019       Dear Dr. Darian Cox:    Thank you for referring Hakan Chan to me for evaluation. Attached you will find relevant portions of my assessment and plan of care.    If you have questions, please do not hesitate to call me. I look forward to following Hakan Chan along with you.    Sincerely,    TABBY Nash II, MD    Enclosure  CC:  No Recipients    If you would like to receive this communication electronically, please contact [email or web address] or [phone] to request more information on [employer] Link access.    For providers and/or their staff who would like to refer a patient to [facility], please contact us through our one-stop-shop provider referral line, [facility], [place], at [phone].    If you feel you have received this communication in error or would no longer like to receive these types of communications, please e-mail [email or web address]

## 2019-11-18 NOTE — TELEPHONE ENCOUNTER
----- Message from Scooter Staton MD sent at 11/18/2019 10:57 AM CST -----  ok  ----- Message -----  From: Martina Berkowitz MA  Sent: 11/18/2019  10:43 AM CST  To: Scooter Staton MD    This is an international patient that needs an EUS. He is leaving on Wednesday. Can I schedule in a 30 minute slot tomorrow?

## 2019-11-18 NOTE — TELEPHONE ENCOUNTER
Spoke with Zoey in International Dept (ext [phone]) about UEUS scheduled tomorrow 11/19/19 at 1330.  Instructions in [employer] [place] faxed to Zoey ([phone])

## 2019-11-18 NOTE — PROGRESS NOTES
VASCULAR SURGERY NOTE    Patient ID: Hakan Chan is a 84 y.o. male.    I. HISTORY     Chief Complaint: Consult    A professional  was present throughout the patient encounter    HPI: Hakan Chan is a Bulgarian Speaking 84 y.o. male who is here today for new patient initial appointment who was initially referred due to veins in his left lower extremity. However, upon discussion with the patient, he reports that the vein does not bother him other than the appearance. He says that they have been enlarging over the last few years. He used to wear compression stockings for these, but they were itchy so he stopped.     However, he does report pain in his bilateral lower extremities (L=R) with walking. He reports a tightening sensation which occurs after walking about a block. At this point, he has to rest and sometimes will massage his legs to help the pain to subside. He feels this problem has also been worsening over the last few years. He used to be able to walk without restrictions and felt as though he had more strength to move than he does now. He has no history of DM and was started on norvasc 2.5 in the last 10 days. He has a remote smoking history of 0.5 PPD x 30 years. He drinks twice as much coffee as water on a daily basis.      Past Medical History:   Diagnosis Date    Glaucoma     Glaucoma     Prostate cancer     Varicose veins of legs         Past Surgical History:   Procedure Laterality Date    CATARACT EXTRACTION W/  INTRAOCULAR LENS IMPLANT Bilateral n/a    Done in [place]    PROSTATE SURGERY         Social History     Tobacco Use   Smoking Status Former Smoker    Last attempt to quit: 1974    Years since quittin.2        Review of Systems   Constitution: Negative for chills, fever and weight loss.   HENT: Negative for congestion, hoarse voice and nosebleeds.    Eyes: Negative for discharge, pain, vision loss in left eye and vision loss in right eye.    Cardiovascular: Negative for chest pain and palpitations.   Respiratory: Positive for cough and shortness of breath. Negative for sputum production.    Endocrine: Negative for cold intolerance, heat intolerance, polydipsia and polyuria.   Skin: Positive for rash. Negative for dry skin.   Gastrointestinal: Positive for abdominal pain. Negative for hematochezia, melena, nausea and vomiting.   Genitourinary: Negative for dysuria and hematuria.   Neurological: Positive for dizziness. Negative for paresthesias.         II. PHYSICAL EXAM     Physical Exam   Constitutional: No distress.   HENT:   Mouth/Throat: Oropharynx is clear and moist. No oropharyngeal exudate.   Eyes: EOM are normal. No scleral icterus.   Neck: No JVD present. No tracheal deviation present.   Cardiovascular: Normal rate and regular rhythm.   Pulmonary/Chest: Effort normal. No respiratory distress.   Musculoskeletal: Normal range of motion. He exhibits no edema.   Neurological: He is alert. No cranial nerve deficit.   Skin: Skin is warm and dry. No rash noted. He is not diaphoretic.   Psychiatric: He has a normal mood and affect.     RLE: 2+ popliteal and DP pulse  LLE: 2+ popliteal and DP pulse. Varicose veins noted to anterior lower leg.     III. ASSESSMENT & PLAN (MEDICAL DECISION MAKING)     1. Venous insufficiency of left leg        Imaging Results:  Venous ultrasound 11/18: All veins patent, no thrombosis noted.  RLE - no reflux noted  LLE - significant reflux noted in GSV including the SFJ. Otherwise, no reflux noted.       Assessment/Diagnosis and Plan:  84 y.o. male with bilateral squeezing calf pain when he walks more than 1 block.  He has evidence of left lower extremity superficial venous insufficiency on ultrasound. He has no evidence of peripheral arterial disease on physical exam.  I suspect that his leg pain is more likely due to deconditioning and cramps related to dehydration than PAD. Recommend conservative treatment of his venous  insufficiency with compression garments.       - Given he has palpable DP pulses bilaterally, would recommend treating his possible claudication symptoms medically. Encouraged aggressive hydration with twice as much water as coffee totaling at least 2L of water perday. Also walking 30min 4x/week  - Provided new prescription for 20-30 mmHg compression stockings as well as eucerin cream to help moisturize his skin    TABBY Nash II, MD, [facility]  Vascular Surgeon  [facility]

## 2019-11-18 NOTE — PROGRESS NOTES
Patient presents for suture removal on left side of face. The wound is well healed without signs of infection.  The sutures are removed. Wound care and activity instructions given. Return prn. Biopsy results are still pending at time of pt's visit.

## 2019-11-19 ENCOUNTER — ANESTHESIA (OUTPATIENT)
Dept: ENDOSCOPY | Facility: HOSPITAL | Age: 84
End: 2019-11-19

## 2019-11-19 ENCOUNTER — HOSPITAL ENCOUNTER (OUTPATIENT)
Facility: HOSPITAL | Age: 84
Discharge: HOME | End: 2019-11-19
Attending: INTERNAL MEDICINE | Admitting: INTERNAL MEDICINE

## 2019-11-19 VITALS
WEIGHT: 178.56 LBS | RESPIRATION RATE: 20 BRPM | OXYGEN SATURATION: 100 % | SYSTOLIC BLOOD PRESSURE: 175 MMHG | TEMPERATURE: 98 F | HEIGHT: 64 IN | HEART RATE: 57 BPM | BODY MASS INDEX: 30.48 KG/M2 | DIASTOLIC BLOOD PRESSURE: 86 MMHG

## 2019-11-19 DIAGNOSIS — R03.0 ELEVATED BLOOD PRESSURE READING WITHOUT DIAGNOSIS OF HYPERTENSION: ICD-10-CM

## 2019-11-19 DIAGNOSIS — E55.9 VITAMIN D DEFICIENCY: Primary | ICD-10-CM

## 2019-11-19 DIAGNOSIS — K86.2 PANCREAS CYST: ICD-10-CM

## 2019-11-19 PROBLEM — I87.2 VENOUS INSUFFICIENCY OF LEFT LEG: Status: ACTIVE | Noted: 2019-11-19

## 2019-11-19 PROCEDURE — D9220A PRA ANESTHESIA: ICD-10-PCS | Mod: ,,, | Performed by: ANESTHESIOLOGY

## 2019-11-19 PROCEDURE — 37000009 HC ANESTHESIA EA ADD 15 MINS: Performed by: INTERNAL MEDICINE

## 2019-11-19 PROCEDURE — D9220A PRA ANESTHESIA: Mod: ,,, | Performed by: ANESTHESIOLOGY

## 2019-11-19 PROCEDURE — 63600175 PHARM REV CODE 636 W HCPCS: Performed by: INTERNAL MEDICINE

## 2019-11-19 PROCEDURE — 43259 EGD US EXAM DUODENUM/JEJUNUM: CPT | Mod: ,,, | Performed by: INTERNAL MEDICINE

## 2019-11-19 PROCEDURE — 63600175 PHARM REV CODE 636 W HCPCS: Performed by: NURSE ANESTHETIST, CERTIFIED REGISTERED

## 2019-11-19 PROCEDURE — 43259 PR ENDOSCOPIC ULTRASOUND EXAM: ICD-10-PCS | Mod: ,,, | Performed by: INTERNAL MEDICINE

## 2019-11-19 PROCEDURE — 37000008 HC ANESTHESIA 1ST 15 MINUTES: Performed by: INTERNAL MEDICINE

## 2019-11-19 PROCEDURE — 43259 EGD US EXAM DUODENUM/JEJUNUM: CPT | Performed by: INTERNAL MEDICINE

## 2019-11-19 RX ORDER — PROPOFOL 10 MG/ML
VIAL (ML) INTRAVENOUS CONTINUOUS PRN
Status: DISCONTINUED | OUTPATIENT
Start: 2019-11-19 | End: 2019-11-19

## 2019-11-19 RX ORDER — PROPOFOL 10 MG/ML
VIAL (ML) INTRAVENOUS
Status: DISCONTINUED | OUTPATIENT
Start: 2019-11-19 | End: 2019-11-19

## 2019-11-19 RX ORDER — SODIUM CHLORIDE 9 MG/ML
INJECTION, SOLUTION INTRAVENOUS CONTINUOUS
Status: DISCONTINUED | OUTPATIENT
Start: 2019-11-19 | End: 2019-11-19 | Stop reason: HOSPADM

## 2019-11-19 RX ORDER — ASPIRIN 325 MG
TABLET, DELAYED RELEASE (ENTERIC COATED) ORAL
Qty: 12 CAPSULE | Refills: 0 | Status: SHIPPED | OUTPATIENT
Start: 2019-11-19

## 2019-11-19 RX ORDER — LIDOCAINE HCL/PF 100 MG/5ML
SYRINGE (ML) INTRAVENOUS
Status: DISCONTINUED | OUTPATIENT
Start: 2019-11-19 | End: 2019-11-19

## 2019-11-19 RX ORDER — AMLODIPINE BESYLATE 2.5 MG/1
TABLET ORAL
Qty: 90 TABLET | Refills: 0 | Status: SHIPPED | OUTPATIENT
Start: 2019-11-19

## 2019-11-19 RX ADMIN — PROPOFOL 20 MG: 10 INJECTION, EMULSION INTRAVENOUS at 01:11

## 2019-11-19 RX ADMIN — LIDOCAINE HYDROCHLORIDE 75 MG: 20 INJECTION, SOLUTION INTRAVENOUS at 01:11

## 2019-11-19 RX ADMIN — PROPOFOL 50 MG: 10 INJECTION, EMULSION INTRAVENOUS at 01:11

## 2019-11-19 RX ADMIN — PROPOFOL 150 MCG/KG/MIN: 10 INJECTION, EMULSION INTRAVENOUS at 01:11

## 2019-11-19 RX ADMIN — SODIUM CHLORIDE: 0.9 INJECTION, SOLUTION INTRAVENOUS at 12:11

## 2019-11-19 NOTE — H&P
Short Stay Endoscopy History and Physical    PCP - Darian Cox MD  Referring Physician - Iron Ronquillo MD  [address] [place] [address]    Procedure - eus  ASA - per anesthesia  Mallampati - per anesthesia  History of Anesthesia problems - no  Family history Anesthesia problems -  no   Plan of anesthesia - General    HPI:  This is a 84 y.o. male here for evaluation of: pancreas cyst    Reflux - no  Dysphagia - no  Abdominal pain - no  Diarrhea - no    ROS:  Constitutional: No fevers, chills, No weight loss  CV: No chest pain  Pulm: No cough, No shortness of breath  Ophtho: No vision changes  GI: see HPI  Derm: No rash    Medical History:  has a past medical history of Glaucoma, Glaucoma, Prostate cancer, and Varicose veins of legs.    Surgical History:  has a past surgical history that includes Prostate surgery and Cataract extraction w/  intraocular lens implant (Bilateral, n/a).    Family History: family history is not on file..    Social History:  reports that he quit smoking about 45 years ago. He has never used smokeless tobacco. He reports that he does not drink alcohol or use drugs.    Review of patient's allergies indicates:  No Known Allergies    Medications:   Medications Prior to Admission   Medication Sig Dispense Refill Last Dose    albuterol (VENTOLIN HFA) 90 mcg/actuation inhaler Inhale 2 puffs into the lungs every 6 (six) hours as needed for Wheezing or Shortness of Breath. Rescue 8.5 g 11 11/19/2019 at Unknown time    amLODIPine (NORVASC) 2.5 MG tablet Lyle 1 tableta por love. 10 tablet 0 11/19/2019 at Unknown time    dorzolamide-timolol 2-0.5% (COSOPT) 22.3-6.8 mg/mL ophthalmic solution Place 1 drop into both eyes 2 (two) times daily. 10 mL 11     emollient combination no.72 (EUCERIN INTENSIVE REPAIR) Lotn Apply 1 each topically daily as needed. 250 mL 3     fluticasone propionate (CUTIVATE) 0.005 % ointment Apply topically 2 (two) times daily. 30 g 2 Taking     fluticasone propionate (FLOVENT HFA) 110 mcg/actuation inhaler Inhale 1 puff into the lungs 2 (two) times daily. Controller 12 g 3 Taking    montelukast (SINGULAIR) 10 mg tablet Take 1 tablet (10 mg total) by mouth every evening. 30 tablet 11 More than a month at Unknown time    travoprost (TRAVATAN Z) 0.004 % ophthalmic solution Place 1 drop into both eyes nightly. 2.5 mL 12        Physical Exam:    Vital Signs:   Vitals:    11/19/19 1243   BP: (!) 152/81   Pulse: (!) 53   Resp: 16   Temp: 97.7 °F (36.5 °C)       General Appearance: Well appearing in no acute distress    Labs:  Lab Results   Component Value Date    WBC 7.23 11/12/2019    HGB 13.5 (L) 11/12/2019    HCT 42.4 11/12/2019     11/12/2019    CHOL 177 11/12/2019    TRIG 78 11/12/2019    HDL 47 11/12/2019    ALT 20 11/12/2019    AST 25 11/12/2019     11/12/2019    K 4.5 11/12/2019     11/12/2019    CREATININE 1.4 11/12/2019    BUN 23 11/12/2019    CO2 25 11/12/2019    TSH 1.434 11/12/2019    PSA 0.12 08/19/2014    HGBA1C 5.6 11/12/2019       I have explained the risks and benefits of this endoscopic procedure to the patient including but not limited to bleeding, inflammation, infection, perforation, and death.      Scooter Staton MD

## 2019-11-19 NOTE — ANESTHESIA POSTPROCEDURE EVALUATION
Anesthesia Post Evaluation    Patient: Hakan Chan    Procedure(s) Performed: Procedure(s) (LRB):  ULTRASOUND, UPPER GI TRACT, ENDOSCOPIC (N/A)    Final Anesthesia Type: general    Patient location during evaluation: PACU  Patient participation: Yes- Able to Participate  Level of consciousness: awake and alert and oriented  Post-procedure vital signs: reviewed and stable  Pain management: adequate  Airway patency: patent    PONV status at discharge: No PONV  Anesthetic complications: no      Cardiovascular status: blood pressure returned to baseline and hemodynamically stable  Respiratory status: unassisted  Hydration status: euvolemic  Follow-up not needed.          Vitals Value Taken Time   /73 11/19/2019  2:17 PM   Temp 36.7 °C (98.1 °F) 11/19/2019  1:55 PM   Pulse 50 11/19/2019  2:31 PM   Resp 16 11/19/2019  2:31 PM   SpO2 100 % 11/19/2019  2:31 PM   Vitals shown include unvalidated device data.      No case tracking events are documented in the log.      Pain/Micha Score: No data recorded

## 2019-11-19 NOTE — [employer] PATIENT INSTRUCTIONS
Discharge Summary/Instructions after an Endoscopic Procedure  Patient Name: Hakan Chan  Patient MRN: [id number]  Patient YOB: 1935 Tuesday, November 19, 2019  Scooter Staton MD  RESTRICTIONS:  During your procedure today, you received medications for sedation.  These   medications may affect your judgment, balance and coordination.  Therefore,   for 24 hours, you have the following restrictions:   - DO NOT drive a car, operate machinery, make legal/financial decisions,   sign important papers or drink alcohol.    ACTIVITY:  Today: no heavy lifting, straining or running due to procedural   sedation/anesthesia.  The following day: return to full activity including work.  DIET:  Eat and drink normally unless instructed otherwise.     TREATMENT FOR COMMON SIDE EFFECTS:  - Mild abdominal pain, nausea, belching, bloating or excessive gas:  rest,   eat lightly and use a heating pad.  - Sore Throat: treat with throat lozenges and/or gargle with warm salt   water.  - Because air was used during the procedure, expelling large amounts of air   from your rectum or belching is normal.  - If a bowel prep was taken, you may not have a bowel movement for 1-3 days.    This is normal.  SYMPTOMS TO WATCH FOR AND REPORT TO YOUR PHYSICIAN:  1. Abdominal pain or bloating, other than gas cramps.  2. Chest pain.  3. Back pain.  4. Signs of infection such as: chills or fever occurring within 24 hours   after the procedure.  5. Rectal bleeding, which would show as bright red, maroon, or black stools.   (A tablespoon of blood from the rectum is not serious, especially if   hemorrhoids are present.)  6. Vomiting.  7. Weakness or dizziness.  GO DIRECTLY TO THE NEAREST EMERGENCY ROOM IF YOU HAVE ANY OF THE FOLLOWING:      Difficulty breathing              Chills and/or fever over 101 F   Persistent vomiting and/or vomiting blood   Severe abdominal pain   Severe chest pain   Black, tarry stools   Bleeding- more than one  tablespoon   Any other symptom or condition that you feel may need urgent attention  Your doctor recommends these additional instructions:  If any biopsies were taken, your doctors clinic will contact you in 1 to 2   weeks with any results.  - Discharge patient to home.   - Resume previous diet.   - Continue present medications.   - Repeat the upper endoscopic ultrasound in 1 year for surveillance to   insure stability  For questions, problems or results please call your physician - Scooter Staton MD at Work:  [phone].  [facility], EMERGENCY ROOM PHONE NUMBER: [phone]  IF A COMPLICATION OR EMERGENCY SITUATION ARISES AND YOU ARE UNABLE TO REACH   YOUR PHYSICIAN - GO DIRECTLY TO THE EMERGENCY ROOM.  Scooter Staton MD  11/19/2019 1:58:10 PM  This report has been verified and signed electronically.  [employer]

## 2019-11-19 NOTE — PLAN OF CARE
at bedside with doctor for discussion for findings. Patient and daughter state they are ready to be discharged. Instructions and report given to patient and family. Both verbalize understanding. Patient tolerating po liquids with no difficulty. Patient denies pain. Anesthesia consent and surgical consent in chart upon patient's discharge from [facility].   Plan of Care/Assessment and Intervention Speech Language Pathology Evaluation

## 2019-11-19 NOTE — TRANSFER OF CARE
"Anesthesia Transfer of Care Note    Patient: Hakan Chan    Procedure(s) Performed: Procedure(s) (LRB):  ULTRASOUND, UPPER GI TRACT, ENDOSCOPIC (N/A)    Patient location: [facility]    Anesthesia Type: general    Transport from OR: Transported from OR on room air with adequate spontaneous ventilation    Post pain: adequate analgesia    Post assessment: no apparent anesthetic complications and tolerated procedure well    Post vital signs: stable    Level of consciousness: sedated and responds to stimulation    Nausea/Vomiting: no nausea/vomiting    Complications: none    Transfer of care protocol was followed      Last vitals:   Visit Vitals  BP (!) 152/81 (Patient Position: Lying)   Pulse (!) 53   Temp 36.5 °C (97.7 °F) (Temporal)   Resp 16   Ht 5' 4" (1.626 m)   Wt 81 kg (178 lb 9.2 oz)   SpO2 99%   BMI 30.65 kg/m²     "

## 2019-11-19 NOTE — DISCHARGE INSTRUCTIONS
Endoscopic Ultrasound (EUS)    An endoscopic ultrasound (EUS) is a test to look at the inside of your gastrointestinal (GI) tract. It's commonly used to look for cancers or growths in the esophagus, stomach, pancreas, liver, and rectum. It can help to stage cancer (see how advanced a cancer is). EUS may also be used to help diagnose certain diseases or to drain cysts or abscesses.  What is EUS?  EUS shows both ultrasound images and live video of the GI tract. During the test, a flexible tube called an endoscope (scope) is used. At the end of the scope is a tiny video camera and light. The video camera sends live images to a monitor. The scope also contains a very small ultrasound device. This uses sound waves to create images and send them to a monitor.  A needle is passed through the scope. The needle can be used take a small sample of tissue for testing. This is called a biopsy. The needle can be used to take a sample of fluid. This is called fine-needle aspiration (FNA).  Risks and possible complications of EUS  Risks and possible complications include the following:  · Bleeding  · Infection  · A perforation (hole) in the digestive tract   · Risks of sedation or anesthesia   Before the test  Be prepared prior to the test:  · Tell your healthcare provider what medicine you take. This includes vitamins, herbs, and over-the-counter medicine. It also includes any blood thinners, such as warfarin, clopidogrel, ibuprofen, or daily aspirin. Ask your healthcare provider if you need to stop taking some or all of them before the test.  · You may be prescribed antibiotics to take before or after the test. This depends on the area being studied and what is done during the test. These medicines help prevent infection.  · Carefully follow the instructions for preparing for the test to make sure results are accurate. Instructions may include:  ¨ If youre having an EUS of the upper GI tract (esophagus, stomach, duodenum,  pancreas, liver):  § Do not eat or drink for 6 hours before the test.  ¨ If youre having an EUS of the lower GI tract (rectum):  § Before the test, do bowel prep as instructed to clean your rectum of stool. This may involve a clear liquid diet and using a laxative (liquid or pills) the night before the test. Or it may mean doing one or more enemas the morning of the test.  § Do not eat or drink for 6 hours before the test.  · Be sure to arrive on time at the facility. Bring your identification and health insurance card. Leave valuables at home. If you have them, bring X-rays or other test results with you.  Let the healthcare provider know  For your safety, tell the healthcare provider if you:  · Take insulin. Your dose may need to be changed on the day of your test.  · Are allergic to latex.  · Have any other allergies.  · Are taking blood thinners.   During the test  An endoscopic ultrasound usually takes place in a hospital. The procedure itself may take 1 to 2 hours. You will likely go home soon afterward. During the test:  · You lie on your left side on an exam table.  · An intravenous (IV) line will be put into a vein in your arm or hand. This line supplies fluids and medicines. To keep you comfortable during the test, you will be given a sedative medicine. This medicine prevents discomfort and will make you sleepy.  · If you are having an EUS of the upper GI tract, local anesthetic may be sprayed in your throat. This will help you be more comfortable as the healthcare provider inserts the scope. The healthcare provider then gently puts the flexible scope into your mouth or nose and down your throat.  · If youre having an EUS of the lower GI tract, the healthcare provider gently puts the flexible scope into your anus.  · During the test, the scope sends live video and ultrasound images from inside your body to nearby monitors. These are used to examine your GI tract. Specialized procedures, such as drainage,  are done as needed.  · The healthcare provider may discuss the results with you soon after the test. Biopsy results take several  days.  · In most cases, you can go home within a few hours of the test. When you leave the facility, have an adult family member or friend drive you, even if you don't feel that sleepy.  After the test  Here is what to expect after the test:  · You may feel tired from the sedative. This should wear off by the end of the day.  · If you had an upper digestive endoscopy, your throat may feel sore for a day or two. Over-the-counter sore throat lozenges and spray should help.  · You can eat and drink normally as soon as the test is done.  When to call the healthcare provider  Call your healthcare provider if you notice any of the following:  · Fever of 100.4°F (38.0°C) or higher, or as advised by your healthcare provider  · Shortness of breath  · Vomiting blood, blood in stool, or black stools  · Coughing or hoarse voice that wont go away   Date Last Reviewed: 7/1/2016 © 2000-2017 [employer]. [address] [place] [address]. All rights reserved. This information is not intended as a substitute for professional medical care. Always follow your healthcare professional's instructions.    PATIENT INSTRUCTIONS  POST-ANESTHESIA    IMMEDIATELY FOLLOWING SURGERY:  Do not drive or operate machinery for the first twenty four hours after surgery.  Do not make any important decisions for twenty four hours after surgery or while taking narcotic pain medications or sedatives.  If you develop intractable nausea and vomiting or a severe headache please notify your doctor immediately.    FOLLOW-UP:  Please make an appointment with your surgeon as instructed. You do not need to follow up with anesthesia unless specifically instructed to do so.    WOUND CARE INSTRUCTIONS (if applicable):  Keep a dry clean dressing on the anesthesia/puncture wound site if there is drainage.  Once the  wound has quit draining you may leave it open to air.  Generally you should leave the bandage intact for twenty four hours unless there is drainage.  If the epidural site drains for more than 36-48 hours please call the anesthesia department.    QUESTIONS?:  Please feel free to call your physician or the hospital  if you have any questions, and they will be happy to assist you.       [facility] Anesthesia Department  [address] [facility]  [phone]

## 2019-11-19 NOTE — ANESTHESIA PREPROCEDURE EVALUATION
11/19/2019  Hakan Chan is a 84 y.o., male with PMH of HTN and epigastric pain who is scheduled for EUS for spot on pancreas noted on prior scan. Patient Turkmen-speaking only; consent obtained via .     Anesthesia Evaluation    I have reviewed the Patient Summary Reports.     I have reviewed the Medications.     Review of Systems  Anesthesia Hx:  No problems with previous Anesthesia  History of prior surgery of interest to airway management or planning: Denies Family Hx of Anesthesia complications.   Denies Personal Hx of Anesthesia complications.   Social:  Non-Smoker, No Alcohol Use    Cardiovascular:   Exercise tolerance: good  Functional Capacity 4 METS    Pulmonary:  Pulmonary Normal        Physical Exam  General:  Well nourished    Airway/Jaw/Neck:  Airway Findings: Mouth Opening: Normal Tongue: Normal  General Airway Assessment: Adult  Mallampati: II  Improves to II, I with phonation.  TM Distance: Normal, at least 6 cm      Dental:  Dental Findings: In tact   Chest/Lungs:  Chest/Lungs Findings: Clear to auscultation, Normal Respiratory Rate     Heart/Vascular:  Heart Findings: Rate: Normal  Rhythm: Regular Rhythm  Sounds: Normal        Mental Status:  Mental Status Findings:  Cooperative, Alert and Oriented         Anesthesia Plan  Type of Anesthesia, risks & benefits discussed:  Anesthesia Type:  general, MAC  Patient's Preference:   Intra-op Monitoring Plan:   Intra-op Monitoring Plan Comments:   Post Op Pain Control Plan:   Post Op Pain Control Plan Comments:   Induction:   IV  Beta Blocker:  Patient is not currently on a Beta-Blocker (No further documentation required).       Informed Consent: Patient understands risks and agrees with Anesthesia plan.  Questions answered. Anesthesia consent signed with patient.  ASA Score: 2     Day of Surgery Review of History & Physical: I  have interviewed and examined the patient. I have reviewed the patient's H&P dated:  There are no significant changes.          Ready For Surgery From Anesthesia Perspective.

## 2019-11-21 LAB
FINAL PATHOLOGIC DIAGNOSIS: NORMAL
GROSS: NORMAL
MICROSCOPIC EXAM: NORMAL

## 2020-08-12 ENCOUNTER — TELEPHONE (OUTPATIENT)
Dept: ENDOSCOPY | Facility: HOSPITAL | Age: 85
End: 2020-08-12

## 2020-08-12 DIAGNOSIS — K86.2 PANCREATIC CYST: Primary | ICD-10-CM

## 2022-10-06 ENCOUNTER — PATIENT MESSAGE (OUTPATIENT)
Dept: INTERNAL MEDICINE | Facility: CLINIC | Age: 87
End: 2022-10-06

## 2022-10-24 ENCOUNTER — PATIENT MESSAGE (OUTPATIENT)
Dept: INTERNAL MEDICINE | Facility: CLINIC | Age: 87
End: 2022-10-24